# Patient Record
(demographics unavailable — no encounter records)

---

## 2024-11-07 NOTE — PROCEDURE
[FreeTextEntry6] : Written consent obtained. Skin cleaned with alcohol. 50 units of botox injected. 15 cc to glabella, 10 cc to each crows feet, and 15 units to frontalis. We then cleansed the lips with alcohol and injected 1 syringe of volbella to the lips. No complications.

## 2024-11-07 NOTE — ASSESSMENT
[FreeTextEntry1] : Delia is a 48 year old female who presents for neurotoxin injection and filler injection. She received 50 units of botox and 1 syringe of volbellla to the lips. She tolerated this well. We will also prescribe Latisse to treat her hypopigmented scars.

## 2024-11-07 NOTE — HISTORY OF PRESENT ILLNESS
[FreeTextEntry1] : Delia is a 48 year old female who presents today for neurotoxin modulators to her forehead and crows feet and filler to her lips. She regularly receives botox to the face and has had prior filler to her lips over a year ago. She denies blood thinners or pregnancy. She is also interested in having a treatment done to her facelift scars which she had done in [place] one year ago and are now hypopigmented.